# Patient Record
Sex: FEMALE | Race: WHITE | Employment: PART TIME | URBAN - METROPOLITAN AREA
[De-identification: names, ages, dates, MRNs, and addresses within clinical notes are randomized per-mention and may not be internally consistent; named-entity substitution may affect disease eponyms.]

---

## 2017-08-06 ENCOUNTER — OFFICE VISIT (OUTPATIENT)
Dept: URGENT CARE | Facility: URGENT CARE | Age: 37
End: 2017-08-06
Payer: COMMERCIAL

## 2017-08-06 VITALS
DIASTOLIC BLOOD PRESSURE: 73 MMHG | HEIGHT: 60 IN | TEMPERATURE: 98.5 F | WEIGHT: 115 LBS | SYSTOLIC BLOOD PRESSURE: 130 MMHG | OXYGEN SATURATION: 97 % | BODY MASS INDEX: 22.58 KG/M2 | HEART RATE: 67 BPM

## 2017-08-06 DIAGNOSIS — S05.01XA CONJUNCTIVAL ABRASION, RIGHT, INITIAL ENCOUNTER: Primary | ICD-10-CM

## 2017-08-06 DIAGNOSIS — H18.821: ICD-10-CM

## 2017-08-06 PROCEDURE — 99213 OFFICE O/P EST LOW 20 MIN: CPT | Performed by: FAMILY MEDICINE

## 2017-08-06 RX ORDER — POLYMYXIN B SULFATE AND TRIMETHOPRIM 1; 10000 MG/ML; [USP'U]/ML
1 SOLUTION OPHTHALMIC EVERY 4 HOURS
Qty: 10 ML | Refills: 0 | Status: SHIPPED | OUTPATIENT
Start: 2017-08-06 | End: 2017-08-12

## 2017-08-06 NOTE — PROGRESS NOTES
SUBJECTIVE:     Chief Complaint   Patient presents with     Urgent Care     Pt in clinic to have eval for bilateral eye irritation and discharge.     Eye Problem     History of Present Illness:  Hellen Dixon is a 37 year old female who presents complaining of moderate right eye mattering, redness, injury -  Possible contact lens overwear for 1 day(s). , some watering in both eyes  Onset/timing: sudden.    Associated Signs and Symptoms: none  Treatment measures tried include: flushed with water  and took out her contacts  Contact wearer : Yes   Past Medical History:   Diagnosis Date     NO ACTIVE PROBLEMS        ALLERGIES:  Review of patient's allergies indicates no known allergies.      Current Outpatient Prescriptions on File Prior to Visit:  Naproxen Sodium (ALEVE PO)    ALLEGRA-D 12 HOUR  MG PO TB12 1 TABLET TWICE DAILY ON EMPTY STOMACH (Patient not taking: No sig reported)   ALLEGRA-D 24 HOUR 180-240 MG PO TB24 TAKE ONE DAILY (Patient not taking: No sig reported)   ZYRTEC 10 MG OR TABS 1 TABLET DAILY   FLUTICASONE PROPIONATE (NASAL) 50 MCG/ACT NA SUSP 2 squirts in each nostril daily (Patient not taking: No sig reported)   ROBITUSSIN A-C  MG/5ML OR SYRP ONE TO TWO TEASPOONS EVER 4 HOURS, AS NEEDED FOR COUGH (Patient not taking: No sig reported)     No current facility-administered medications on file prior to visit.     Social History   Substance Use Topics     Smoking status: Former Smoker     Smokeless tobacco: Not on file      Comment: quit 1999     Alcohol use Yes      Comment: socially       Family History   Problem Relation Age of Onset     Family History Negative No family hx of          ROS:  INTEGUMENTARY/SKIN: NEGATIVE for worrisome rashes, moles or lesions  ENT/MOUTH: NEGATIVE for ear, mouth and throat problems  RESP:NEGATIVE for significant cough or SOB  GI: NEGATIVE for nausea, abdominal pain, heartburn, or change in bowel habits    OBJECTIVE:  /73  Pulse 67  Temp 98.5  F (36.9   C) (Oral)  Ht 5' (1.524 m)  Wt 115 lb (52.2 kg)  SpO2 97%  BMI 22.46 kg/m2  General: no acute distress  Right eye:BLACK, EOMI, fundi normal, corneas normal, no foreign bodies, flourescein staining shows scratch of conjunctiva, inferior nasal side  6 mm long 1 mm wide,  Close, but outside the margin of the cornea, visual acuity normal both eyes, no periorbital cellulitis  Left eye: BLACK, EOMI, fundi normal, corneas normal, no foreign bodies, flourescein staining is negative, visual acuity normal both eyes, no periorbital cellulitis     Ears: normal canals, TMs bilaterally, normal TM mobility  Nose: NORMAL - no drainage, turbinates normal in size.  Neck: supple, non-tender, free range of motion, no adenopathy    ASSESSMENT/ PLAN  Conjunctival abrasion, right, initial encounter     - trimethoprim-polymyxin b (POLYTRIM) ophthalmic solution; Place 1 drop into the right eye every 4 hours for 6 days    Contact lens overwear, right      - trimethoprim-polymyxin b (POLYTRIM) ophthalmic solution; Place 1 drop into the right eye every 4 hours for 6 days     To ER if acute change in vision,   Follow-up with ophthalmology if symptoms do not resolve as expected

## 2017-08-06 NOTE — MR AVS SNAPSHOT
After Visit Summary   8/6/2017    Hellen Dixon    MRN: [id number]           Patient Information     Date Of Birth          1980        Visit Information        Provider Department      8/6/2017 10:50 AM Shruthi Reyes MD [facility]        Today's Diagnoses     Conjunctival abrasion, right, initial encounter    -  1    Contact lens overwear, right          Care Instructions      Contact Lens Injury    Your contact lens can cause injury to the cornea (the clear part in the front of the eye). This can occur by sleeping with a hard or soft contact lens in place or wearing a contact lens longer than advised. There is also an increased risk of injury if your eyes dry out too much while wearing a contact lens.  The cornea is very painful when injured, but it usually heals quickly. It usually improves within 24 to 48 hours. If the injury is deep, your healthcare provider may apply an eye patch. This is to reduce pain and speed up the healing process. An antibiotic ointment or eye drops may also be used. Healing is complete when the pain stops and there are no other symptoms, such as eye redness, tearing or discharge, or worsening vision.  Home care    Do not wear contacts until you are pain free.    A cold pack (ice in a plastic bag, wrapped in a towel) may be applied over the eye for 20 minutes at a time to reduce pain.    Acetaminophen or ibuprofen can be used for pain, unless another medicine was prescribed. (Note: If you have chronic liver or kidney disease, or have ever had a stomach ulcer or gastrointestinal bleeding, talk with your healthcare provider before using these medicines.)    If an eye patch was applied:    Apply the ice pack directly over the eye patch as described above.    If you were given a  follow-up appointment for patch removal and re-exam, do not miss it. An eye patch should not be left in place for more than 48 hours, unless advised to do so by your  "healthcare provider.    Do not drive a motor vehicle or operate machinery with the patch in place. You will have difficulty in judging distances with only one eye.  Follow-up care  Follow up with your healthcare provider, or as advised.  When to seek medical advice  Call your healthcare provider right away if any of these occur:    Increasing eye pain or pain that does not improve after 24 hours    Discharge from the eye    Increasing redness of the eye or swelling of the eyelids    Worsening vision  Date Last Reviewed: 6/14/2015 2000-2017 [employer]. [address] [place], [place] [id number]. All rights reserved. This information is not intended as a substitute for professional medical care. Always follow your healthcare professional's instructions.                Follow-ups after your visit        Who to contact     If you have questions or need follow up information about today's clinic visit or your schedule please contact [facility] directly at [phone].  Normal or non-critical lab and imaging results will be communicated to you by MyChart, letter or phone within 4 business days after the clinic has received the results. If you do not hear from us within 7 days, please contact the clinic through [employer] or phone. If you have a critical or abnormal lab result, we will notify you by phone as soon as possible.  Submit refill requests through [employer] or call your pharmacy and they will forward the refill request to us. Please allow 3 business days for your refill to be completed.          Additional Information About Your Visit        [employer] Information     [employer] lets you send messages to your doctor, view your test results, renew your prescriptions, schedule appointments and more. To sign up, go to [email or web address] . Click on \"Log in\" on the left side of the screen, which will take you to the Welcome page. Then click on \"Sign up Now\" on the right side of the " page.     You will be asked to enter the access code listed below, as well as some personal information. Please follow the directions to create your username and password.     Your access code is: [email or web address]  Expires: 2017 11:32 AM     Your access code will  in 90 days. If you need help or a new code, please call your [place] clinic or [phone].        Care EveryWhere ID     This is your Care EveryWhere ID. This could be used by other organizations to access your [place] medical records  [id number]        Your Vitals Were     Pulse Temperature Height Pulse Oximetry BMI (Body Mass Index)       67 98.5  F (36.9  C) (Oral) 5' (1.524 m) 97% 22.46 kg/m2        Blood Pressure from Last 3 Encounters:   17 130/73   14 116/70   01/07/10 116/72    Weight from Last 3 Encounters:   17 115 lb (52.2 kg)   14 112 lb (50.8 kg)   01/07/10 109 lb (49.4 kg)              Today, you had the following     No orders found for display         Today's Medication Changes          These changes are accurate as of: 17 11:32 AM.  If you have any questions, ask your nurse or doctor.               Start taking these medicines.        Dose/Directions    trimethoprim-polymyxin b ophthalmic solution   Commonly known as:  POLYTRIM   Used for:  Contact lens overwear, right, Conjunctival abrasion, right, initial encounter   Started by:  Shruthi Reyes MD        Dose:  1 drop   Place 1 drop into the right eye every 4 hours for 6 days   Quantity:  10 mL   Refills:  0            Where to get your medicines      These medications were sent to [facility] [address] [facility] [address] [facility] [address]  [address], [facility] [address]     Phone:  [phone]     trimethoprim-polymyxin b ophthalmic solution                Primary Care Provider    None Specified       No primary provider on file.        Equal Access to Services     SHAQUILLE ERNANDEZ AH: Heaven wagner  su Rizo, waaxda luqadaha, qaybta kaalmada adejorge, minor matthewscasimiro citlalli. So [facility] [phone].    ATENCIÓN: Si jaime moreland, tiene a roman disposición servicios gratuitos de asistencia lingüística. Hang al [phone].    We comply with applicable federal civil rights laws and [place] laws. We do not discriminate on the basis of race, color, national origin, age, disability sex, sexual orientation or gender identity.            Thank you!     Thank you for choosing [facility]  for your care. Our goal is always to provide you with excellent care. Hearing back from our patients is one way we can continue to improve our services. Please take a few minutes to complete the written survey that you may receive in the mail after your visit with us. Thank you!             Your Updated Medication List - Protect others around you: Learn how to safely use, store and throw away your medicines at www.disposemymeds.org.          This list is accurate as of: 8/6/17 11:32 AM.  Always use your most recent med list.                   Brand Name Dispense Instructions for use Diagnosis    ALEVE PO           * ALLEGRA-D 24 HOUR 180-240 MG per 24 hr tablet   Generic drug:  fexofenadine-pseudoePHEDrine     30 Tab    TAKE ONE DAILY    Chronic rhinitis       * ALLEGRA-D 12 HOUR  MG per 12 hr tablet   Generic drug:  fexofenadine-pseudoePHEDrine     60 Tab    1 TABLET TWICE DAILY ON EMPTY STOMACH    Allergies       FLUTICASONE PROPIONATE (NASAL) 50 MCG/ACT Susp     1    2 squirts in each nostril daily    Chronic rhinitis       ROBITUSSIN A-C  MG/5ML Syrp   Generic drug:  CODEINE-GUAIFENESIN     4 OZ    ONE TO TWO TEASPOONS EVER 4 HOURS, AS NEEDED FOR COUGH    Cough       trimethoprim-polymyxin b ophthalmic solution    POLYTRIM    10 mL    Place 1 drop into the right eye every 4 hours for 6 days    Contact lens overwear, right, Conjunctival abrasion, right, initial encounter        ZYRTEC 10 MG tablet   Generic drug:  cetirizine      1 TABLET DAILY        * Notice:  This list has 2 medication(s) that are the same as other medications prescribed for you. Read the directions carefully, and ask your doctor or other care provider to review them with you.

## 2017-08-06 NOTE — NURSING NOTE
Chief Complaint   Patient presents with     Urgent Care     Pt in clinic to have eval for bilateral eye irritation and discharge.     Eye Problem       Initial /73  Pulse 67  Temp 98.5  F (36.9  C) (Oral)  Ht 5' (1.524 m)  Wt 115 lb (52.2 kg)  SpO2 97%  BMI 22.46 kg/m2 Estimated body mass index is 22.46 kg/(m^2) as calculated from the following:    Height as of this encounter: 5' (1.524 m).    Weight as of this encounter: 115 lb (52.2 kg).  Medication Reconciliation: complete   Farideh Philip/ MA

## 2017-08-06 NOTE — PATIENT INSTRUCTIONS
Contact Lens Injury    Your contact lens can cause injury to the cornea (the clear part in the front of the eye). This can occur by sleeping with a hard or soft contact lens in place or wearing a contact lens longer than advised. There is also an increased risk of injury if your eyes dry out too much while wearing a contact lens.  The cornea is very painful when injured, but it usually heals quickly. It usually improves within 24 to 48 hours. If the injury is deep, your healthcare provider may apply an eye patch. This is to reduce pain and speed up the healing process. An antibiotic ointment or eye drops may also be used. Healing is complete when the pain stops and there are no other symptoms, such as eye redness, tearing or discharge, or worsening vision.  Home care    Do not wear contacts until you are pain free.    A cold pack (ice in a plastic bag, wrapped in a towel) may be applied over the eye for 20 minutes at a time to reduce pain.    Acetaminophen or ibuprofen can be used for pain, unless another medicine was prescribed. (Note: If you have chronic liver or kidney disease, or have ever had a stomach ulcer or gastrointestinal bleeding, talk with your healthcare provider before using these medicines.)    If an eye patch was applied:    Apply the ice pack directly over the eye patch as described above.    If you were given a  follow-up appointment for patch removal and re-exam, do not miss it. An eye patch should not be left in place for more than 48 hours, unless advised to do so by your healthcare provider.    Do not drive a motor vehicle or operate machinery with the patch in place. You will have difficulty in judging distances with only one eye.  Follow-up care  Follow up with your healthcare provider, or as advised.  When to seek medical advice  Call your healthcare provider right away if any of these occur:    Increasing eye pain or pain that does not improve after 24 hours    Discharge from the  eye    Increasing redness of the eye or swelling of the eyelids    Worsening vision  Date Last Reviewed: 6/14/2015 2000-2017 [employer]. [address] [place] [address], [place] [address]. All rights reserved. This information is not intended as a substitute for professional medical care. Always follow your healthcare professional's instructions.

## 2018-08-25 ENCOUNTER — OFFICE VISIT (OUTPATIENT)
Dept: URGENT CARE | Facility: URGENT CARE | Age: 38
End: 2018-08-25
Payer: COMMERCIAL

## 2018-08-25 VITALS
HEART RATE: 73 BPM | TEMPERATURE: 98.2 F | SYSTOLIC BLOOD PRESSURE: 100 MMHG | OXYGEN SATURATION: 97 % | BODY MASS INDEX: 23.44 KG/M2 | RESPIRATION RATE: 24 BRPM | DIASTOLIC BLOOD PRESSURE: 50 MMHG | WEIGHT: 120 LBS

## 2018-08-25 DIAGNOSIS — J20.9 ACUTE BRONCHITIS, UNSPECIFIED ORGANISM: ICD-10-CM

## 2018-08-25 DIAGNOSIS — J98.01 ACUTE BRONCHOSPASM: Primary | ICD-10-CM

## 2018-08-25 DIAGNOSIS — R05.9 COUGH: ICD-10-CM

## 2018-08-25 PROCEDURE — 99214 OFFICE O/P EST MOD 30 MIN: CPT | Performed by: FAMILY MEDICINE

## 2018-08-25 PROCEDURE — 87801 DETECT AGNT MULT DNA AMPLI: CPT | Performed by: FAMILY MEDICINE

## 2018-08-25 RX ORDER — ALBUTEROL SULFATE 90 UG/1
2 AEROSOL, METERED RESPIRATORY (INHALATION) EVERY 6 HOURS PRN
Qty: 1 INHALER | Refills: 0 | Status: SHIPPED | OUTPATIENT
Start: 2018-08-25

## 2018-08-25 RX ORDER — CODEINE PHOSPHATE AND GUAIFENESIN 10; 100 MG/5ML; MG/5ML
2 SOLUTION ORAL EVERY 4 HOURS PRN
Qty: 120 ML | Refills: 0 | Status: SHIPPED | OUTPATIENT
Start: 2018-08-25

## 2018-08-25 NOTE — PROGRESS NOTES
SUBJECTIVE:   Hellen Dixon is a 38 year old female presenting with a chief complaint of cough.  Initially had sore throat about 4-5 days ago, this has improved.  Patient was seen at [facility] and had negative strep.  Unable to sleep.  Onset of symptoms was 2 day(s) ago for cough, overall has been sick for about 4-5 days.  Course of illness is worsening.    Severity moderate  Current and Associated symptoms: cough  Treatment measures tried include: Delsym, Guaifenesin, Fluids and Rest.  Predisposing factors include: seasonal allergies.    Tdap 2014  Family history - sister with asthma    No current PCP.    Past Medical History:   Diagnosis Date     NO ACTIVE PROBLEMS      Current Outpatient Prescriptions   Medication Sig Dispense Refill     Naproxen Sodium (ALEVE PO)        ZYRTEC 10 MG OR TABS 1 TABLET DAILY       ALLEGRA-D 12 HOUR  MG PO TB12 1 TABLET TWICE DAILY ON EMPTY STOMACH (Patient not taking: No sig reported) 60 Tab 5     ALLEGRA-D 24 HOUR 180-240 MG PO TB24 TAKE ONE DAILY (Patient not taking: No sig reported) 30 Tab 5     FLUTICASONE PROPIONATE (NASAL) 50 MCG/ACT NA SUSP 2 squirts in each nostril daily (Patient not taking: No sig reported) 1 5     ROBITUSSIN A-C  MG/5ML OR SYRP ONE TO TWO TEASPOONS EVER 4 HOURS, AS NEEDED FOR COUGH (Patient not taking: No sig reported) 4 OZ 0     Social History   Substance Use Topics     Smoking status: Former Smoker     Smokeless tobacco: Not on file      Comment: quit 1999     Alcohol use Yes      Comment: socially       ROS:  Review of systems negative except as stated above.    OBJECTIVE:  /50 (Cuff Size: Adult Regular)  Pulse 73  Temp 98.2  F (36.8  C) (Tympanic)  Resp 24  Wt 120 lb (54.4 kg)  SpO2 97%  BMI 23.44 kg/m2  GENERAL APPEARANCE: healthy, alert and no distress  EYES: EOMI,  PERRL, conjunctiva clear  HENT: ear canals and TM's normal.  Nose and mouth without ulcers, erythema or lesions  NECK: supple, nontender, no  lymphadenopathy  RESP: lungs clear to auscultation - no rales, rhonchi or wheezes  CV: regular rates and rhythm, normal S1 S2, no murmur noted  Extremities: no peripheral edema or tenderness, peripheral pulses normal  NEURO: Normal strength and tone, sensory exam grossly normal,  normal speech and mentation  SKIN: no suspicious lesions or rashes  PSYCH: mentation appears normal and affect normal/bright    ASSESSMENT/PLAN:  (J98.01) Acute bronchospasm  (primary encounter diagnosis)  Plan: albuterol (PROAIR HFA/PROVENTIL HFA/VENTOLIN         HFA) 108 (90 Base) MCG/ACT inhaler            (R05) Cough  Comment: bronchitis/bronchospasm  Plan: Bordetella per/paraper PCR, guaiFENesin-codeine        (ROBITUSSIN AC) 100-10 MG/5ML SOLN solution            (J20.9) Acute bronchitis, unspecified organism  Plan: guaiFENesin-codeine (ROBITUSSIN AC) 100-10         MG/5ML SOLN solution, albuterol (PROAIR         HFA/PROVENTIL HFA/VENTOLIN HFA) 108 (90 Base)         MCG/ACT inhaler              Reassurance given, reviewed symptomatic treatment, plenty of fluids and rest.  RX albuterol inhaler and raven AC given to help with cough.  Discussed that due to coughing to point of throwing up may be caused by pertussis, so will obtain screen.  If positive, then will need antibiotic treatment.  Reviewed at this time that cough is still most likely viral etiology and will not require an antibiotic.  Reviewed that pertussis will cause cough to last for several months.    Return to clinic if no resolution of symptoms.    Florentino Perea MD  August 25, 2018 7:56 PM

## 2018-08-25 NOTE — MR AVS SNAPSHOT
After Visit Summary   8/25/2018    Hellen Dixon    MRN: [id number]           Patient Information     Date Of Birth          1980        Visit Information        Provider Department      8/25/2018 5:55 PM Florentino Perea MD [facility]        Today's Diagnoses     Cough    -  1    Acute bronchospasm          Care Instructions    Okay to take allergy medication.  Okay to use albuterol inhaler to help with cough, wheezing and shortness of breath.  Use robitussin with codeine to help with cough, best at bedtime.      Bronchospasm (Adult)    Bronchospasm occurs when the airways (bronchial tubes) go into spasm and contract. This makes it hard to breathe and causes wheezing (a high-pitched whistling sound). Bronchospasm can also cause frequent coughing without wheezing.  Bronchospasm is due to irritation, inflammation, or allergic reaction of the airways. People with asthma get bronchospasm. However, not everyone with bronchospasm has asthma.  Being exposed to harmful fumes, a recent case of bronchitis, exercise, or a flare-up of chronic obstructive pulmonary disease (COPD) may cause the airways to spasm. An episode of bronchospasm may last 7 to 14 days. Medicine may be prescribed to relax the airways and prevent wheezing. Antibiotics will be prescribed only if your healthcare provider thinks there is a bacterial infection. Antibiotics do not help a viral infection.  Home care    Drink lots of water or other fluids (at least 10 glasses a day) during an attack. This will loosen lung secretions and make it easier to breathe. If you have heart or kidney disease, check with your doctor before you drink extra fluids.    Take prescribed medicine exactly at the times advised. If you take an inhaled medicine to help with breathing, do not use it more than once every 4 hours, unless told to do so. If prescribed an antibiotic or prednisone, take all of the medicine, even if you are feeling better after  "a few days.    Do not smoke. Also avoid being exposed to secondhand smoke.    If you were given an inhaler, use it exactly as directed. If you need to use it more often than prescribed, your condition may be getting worse. Contact your healthcare provider.  Follow-up care  Follow up with your healthcare provider, or as advised.  If you are age 65 or older, have a chronic lung disease or condition that affects your immune system, or you smoke, ask your healthcare provider about getting a pneumococcal vaccine, as well as a yearly flu shot (influenza vaccine).  When to seek medical advice  Call your healthcare provider right away if any of these occur:    You need to use your inhalers more often than usual    Fever of 100.4 F (38 C) or higher, or as directed by your healthcare provider    Cough that brings up lots of dark-colored sputum (mucus)    You don't get better within 24 hours  Call 911  Call 911 if any of these occur:    Coughing up bloody sputum (mucus)    Chest pain with each breath    Increased wheezing or shortness of breath  Date Last Reviewed: 9/13/2015 2000-2017 [employer]. [address] [place] [address]. All rights reserved. This information is not intended as a substitute for professional medical care. Always follow your healthcare professional's instructions.        Whooping Cough (Pertussis) (Adult)  Whooping cough (pertussis) is a bacterial infection in the respiratory tract. It can be a very serious infection in infants and older adults. In healthy older children and adults, it is generally mild.  Pertussis is highly contagious. The infection is spread through the air by coughing or sneezing. The illness starts like an ordinary cold with mild cough, congestion, and low fever. Symptoms then develop that may include:    Coughing spells that cause a \"whooping\" sound when breathing in    Gagging or vomiting after coughing    Poor appetite    Feeling very tired    Thick " mucus in the nose and throat  Antibiotics are used to treat this illness. Even with treatment, it may take up to 3 months for the cough to go away completely.  Vaccination prevents pertussis in children. The vaccine effect lessens after 5 to 10 years. So a booster vaccine is often needed. Teens and adults who were vaccinated as children and have not had a booster can be infected and may spread the infection to unvaccinated infants and children. Be sure to ask your healthcare provider whether anyone in your household needs a booster vaccination.  Home Care    Take all medicine as prescribed by the healthcare provider. Be sure to take antibiotics as directed until they are gone, even if you feel better. If you do not finish the antibiotics, the infection may come back and be harder to treat.    Rest and get plenty of sleep.    Stay home from work or school until you have completed at least 5 days of antibiotic treatment. If antibiotics are not used, stay home until 21 days after you first had symptoms of a cough. When resuming activity, go back to your normal routine gradually.    Avoid exposure to cigarette smoke.    Ask your healthcare provider before taking over-the-counter medicine for fever, pain, and coughing.    To avoid loss of fluids (dehydration), try drinking 6-8 glasses of fluids (water, juice, tea, soup) a day. Fluids will help loosen secretions in the nose and lungs.    Cover your mouth and nose when you sneeze or cough. Dispose of any tissues properly.    Wash your hands well with soap and water after you cough or sneeze, and frequently throughout the day.  Follow-up care  Follow up with your healthcare provider as advised.  When to seek medical advice  Call your healthcare provider right away for any of the following:    Trouble breathing or painful breathing    Cough with repeated gagging or vomiting    Coughing up colored or bloody mucus    Severe headache or face, neck, or ear pain    Fever over  100.4 F (38.0 C) for more than 3 days    You don't start improving within 1 week  Date Last Reviewed: 9/25/2015 2000-2017 [employer]. [address] [place] [address], [place] [address]. All rights reserved. This information is not intended as a substitute for professional medical care. Always follow your healthcare professional's instructions.        Chronic Cough with Uncertain Cause (Adult)    Everyone has had a cough as part of the common cold, flu, or bronchitis. This kind of cough occurs along with an achy feeling, low-grade fever, nasal and sinus congestion, and a scratchy or sore throat. This usually gets better in 2 to 3 weeks. A cough that lasts longer than 3 weeks may be due to other causes.  If your cough does not improve over the next 2 weeks, further testing may be needed. Follow up with your healthcare provider as advised. Cough suppressants may be recommended. Based on your exam today, the exact cause of your cough is not certain. Below are some common causes for persistent cough.  Smoker's cough  Smoker s cough doesn t go away. If you continue to smoke, it only gets worse. The cough is from irritation in the air passages. Talk to your healthcare provider about quitting. Medicines or nicotine-replacement products, like gum or the patch, may make quitting easier.  Postnasal drip  A cough that is worse at night may be due to postnasal drip. Excess mucus in the nose drains from the back of your nose to your throat. This triggers the cough reflex. Postnasal drip may be due to a sinus infection or allergy. Common allergens include dust, tobacco smoke (both inhaled and secondhand smoke), environmental pollutants, pollen, mold, pets, cleaning agents, room deodorizers, and chemical fumes. Over-the-counter antihistamines or decongestants may be helpful for allergies. A sinus infection may requires antibiotic treatment. See your healthcare provider if symptoms continue.  Medicines  Certain prescribed  medicines can cause a chronic cough in some people:    ACE inhibitors for high blood pressure. These include benazepril, captopril, enalapril, fosinopril, lisinopril, quinapril, ramipril, and others.    Beta-blockers for high blood pressure and other conditions. These include propranolol, atenolol, metoprolol, nadolol, and others.  Let your healthcare provider know if you are taking any of these.  Asthma  Cough may be the only sign of mild asthma. You may have tests to find out if asthma is causing your cough. You may also take asthma medicine on a trial basis.  Acid reflux (heartburn, GERD)  The esophagus is the tube that carries food from the mouth to the stomach. A valve at its lower end prevents stomach acids from flowing upward. If this valve does not work properly, acid from the stomach enters the esophagus. This may cause a burning pain in the upper abdomen or lower chest, belching, or cough. Symptoms are often worse when lying flat. Avoid eating or drinking before bedtime. Try using extra pillows to raise your upper body, or place 4-inch blocks under the head of your bed. You may try an over-the-counter antacid or an acid-blocking medicine such as famotidine, cimetidine, ranitidine, esomeprazole, lansoprazole, or omeprazole. Stronger medicines for this condition can be prescribed by your healthcare provider.  Follow-up care  Follow up with your healthcare provider, or as advised, if your cough does not improve. Further testing may be needed.  Note: If an X-ray was taken, a specialist will review it. You will be notified of any new findings that may affect your care.  When to seek medical advice  Call your healthcare provider right away if any of these occur:    Mild wheezing or difficulty breathing    Fever of 100.4 F (38 C) or higher, or as directed by your healthcare provider    Unexpected weight loss    Coughing up large amounts of colored sputum    Night sweats (sheets and pajamas get soaking wet)  Call  "911  Call 911 if any of these occur:    Coughing up blood    Moderate to severe trouble breathing or wheezing  Date Last Reviewed: 2015-2017 [employer]. [address] [place] [address], [place] [address]. All rights reserved. This information is not intended as a substitute for professional medical care. Always follow your healthcare professional's instructions.                Follow-ups after your visit        Who to contact     If you have questions or need follow up information about today's clinic visit or your schedule please contact [facility] directly at [phone].  Normal or non-critical lab and imaging results will be communicated to you by [employer], letter or phone within 4 business days after the clinic has received the results. If you do not hear from us within 7 days, please contact the clinic through [employer] or phone. If you have a critical or abnormal lab result, we will notify you by phone as soon as possible.  Submit refill requests through [employer] or call your pharmacy and they will forward the refill request to us. Please allow 3 business days for your refill to be completed.          Additional Information About Your Visit        MyChart Information     [employer] lets you send messages to your doctor, view your test results, renew your prescriptions, schedule appointments and more. To sign up, go to [email or web address] . Click on \"Log in\" on the left side of the screen, which will take you to the Welcome page. Then click on \"Sign up Now\" on the right side of the page.     You will be asked to enter the access code listed below, as well as some personal information. Please follow the directions to create your username and password.     Your access code is: [email or web address]  Expires: 2018  7:21 PM     Your access code will  in 90 days. If you need help or a new code, please call your [place] clinic or [phone].        Care EveryWhere ID     This " is your Care EveryWhere ID. This could be used by other organizations to access your [place] medical records  [id number]        Your Vitals Were     Pulse Temperature Respirations Pulse Oximetry BMI (Body Mass Index)       73 98.2  F (36.8  C) (Tympanic) 24 97% 23.44 kg/m2        Blood Pressure from Last 3 Encounters:   08/25/18 100/50   08/06/17 130/73   11/06/14 116/70    Weight from Last 3 Encounters:   08/25/18 120 lb (54.4 kg)   08/06/17 115 lb (52.2 kg)   11/06/14 112 lb (50.8 kg)              We Performed the Following     Bordetella per/paraper PCR          Today's Medication Changes          These changes are accurate as of 8/25/18  7:21 PM.  If you have any questions, ask your nurse or doctor.               Start taking these medicines.        Dose/Directions    albuterol 108 (90 Base) MCG/ACT inhaler   Commonly known as:  PROAIR HFA/PROVENTIL HFA/VENTOLIN HFA   Used for:  Acute bronchospasm        Dose:  2 puff   Inhale 2 puffs into the lungs every 6 hours as needed for shortness of breath / dyspnea or wheezing   Quantity:  1 Inhaler   Refills:  0         These medicines have changed or have updated prescriptions.        Dose/Directions    * ROBITUSSIN A-C  MG/5ML Syrp   This may have changed:  Another medication with the same name was added. Make sure you understand how and when to take each.   Used for:  Cough   Generic drug:  CODEINE-GUAIFENESIN        ONE TO TWO TEASPOONS EVER 4 HOURS, AS NEEDED FOR COUGH   Quantity:  4 OZ   Refills:  0       * guaiFENesin-codeine 100-10 MG/5ML Soln solution   Commonly known as:  ROBITUSSIN AC   This may have changed:  You were already taking a medication with the same name, and this prescription was added. Make sure you understand how and when to take each.   Used for:  Cough        Dose:  2 tsp.   Take 10 mLs by mouth every 4 hours as needed for cough   Quantity:  120 mL   Refills:  0       * Notice:  This list has 2 medication(s) that are the same as other  medications prescribed for you. Read the directions carefully, and ask your doctor or other care provider to review them with you.         Where to get your medicines      These medications were sent to [facility] - [facility] [address]  [address], [facility] [address]     Phone:  [phone]     albuterol 108 (90 Base) MCG/ACT inhaler         Some of these will need a paper prescription and others can be bought over the counter.  Ask your nurse if you have questions.     Bring a paper prescription for each of these medications     guaiFENesin-codeine 100-10 MG/5ML Soln solution                Primary Care Provider Fax #    Physician No Ref-Primary [phone]       No address on file        Equal Access to Services     SHAQUILLE ERNANDEZ : Heaven blue Somilla, waaxda luqadaha, qaybta kaalmada adeegyada, minor fan . So [facility] [phone].    ATENCIÓN: Si habla español, tiene a roman disposición servicios gratuitos de asistencia lingüística. Llame al [phone].    We comply with applicable federal civil rights laws and [place] laws. We do not discriminate on the basis of race, color, national origin, age, disability, sex, sexual orientation, or gender identity.            Thank you!     Thank you for choosing [facility]  for your care. Our goal is always to provide you with excellent care. Hearing back from our patients is one way we can continue to improve our services. Please take a few minutes to complete the written survey that you may receive in the mail after your visit with us. Thank you!             Your Updated Medication List - Protect others around you: Learn how to safely use, store and throw away your medicines at www.disposemymeds.org.          This list is accurate as of 8/25/18  7:21 PM.  Always use your most recent med list.                   Brand Name Dispense Instructions  for use Diagnosis    albuterol 108 (90 Base) MCG/ACT inhaler    PROAIR HFA/PROVENTIL HFA/VENTOLIN HFA    1 Inhaler    Inhale 2 puffs into the lungs every 6 hours as needed for shortness of breath / dyspnea or wheezing    Acute bronchospasm       ALEVE PO           * ALLEGRA-D 24 HOUR 180-240 MG per 24 hr tablet   Generic drug:  fexofenadine-pseudoePHEDrine     30 Tab    TAKE ONE DAILY    Chronic rhinitis       * ALLEGRA-D 12 HOUR  MG per 12 hr tablet   Generic drug:  fexofenadine-pseudoePHEDrine     60 Tab    1 TABLET TWICE DAILY ON EMPTY STOMACH    Allergies       FLUTICASONE PROPIONATE (NASAL) 50 MCG/ACT Susp     1    2 squirts in each nostril daily    Chronic rhinitis       * ROBITUSSIN A-C  MG/5ML Syrp   Generic drug:  CODEINE-GUAIFENESIN     4 OZ    ONE TO TWO TEASPOONS EVER 4 HOURS, AS NEEDED FOR COUGH    Cough       * guaiFENesin-codeine 100-10 MG/5ML Soln solution    ROBITUSSIN AC    120 mL    Take 10 mLs by mouth every 4 hours as needed for cough    Cough       ZYRTEC 10 MG tablet   Generic drug:  cetirizine      1 TABLET DAILY        * Notice:  This list has 4 medication(s) that are the same as other medications prescribed for you. Read the directions carefully, and ask your doctor or other care provider to review them with you.

## 2018-08-26 NOTE — PATIENT INSTRUCTIONS
Okay to take allergy medication.  Okay to use albuterol inhaler to help with cough, wheezing and shortness of breath.  Use robitussin with codeine to help with cough, best at bedtime.      Bronchospasm (Adult)    Bronchospasm occurs when the airways (bronchial tubes) go into spasm and contract. This makes it hard to breathe and causes wheezing (a high-pitched whistling sound). Bronchospasm can also cause frequent coughing without wheezing.  Bronchospasm is due to irritation, inflammation, or allergic reaction of the airways. People with asthma get bronchospasm. However, not everyone with bronchospasm has asthma.  Being exposed to harmful fumes, a recent case of bronchitis, exercise, or a flare-up of chronic obstructive pulmonary disease (COPD) may cause the airways to spasm. An episode of bronchospasm may last 7 to 14 days. Medicine may be prescribed to relax the airways and prevent wheezing. Antibiotics will be prescribed only if your healthcare provider thinks there is a bacterial infection. Antibiotics do not help a viral infection.  Home care    Drink lots of water or other fluids (at least 10 glasses a day) during an attack. This will loosen lung secretions and make it easier to breathe. If you have heart or kidney disease, check with your doctor before you drink extra fluids.    Take prescribed medicine exactly at the times advised. If you take an inhaled medicine to help with breathing, do not use it more than once every 4 hours, unless told to do so. If prescribed an antibiotic or prednisone, take all of the medicine, even if you are feeling better after a few days.    Do not smoke. Also avoid being exposed to secondhand smoke.    If you were given an inhaler, use it exactly as directed. If you need to use it more often than prescribed, your condition may be getting worse. Contact your healthcare provider.  Follow-up care  Follow up with your healthcare provider, or as advised.  If you are age 65 or older,  "have a chronic lung disease or condition that affects your immune system, or you smoke, ask your healthcare provider about getting a pneumococcal vaccine, as well as a yearly flu shot (influenza vaccine).  When to seek medical advice  Call your healthcare provider right away if any of these occur:    You need to use your inhalers more often than usual    Fever of 100.4 F (38 C) or higher, or as directed by your healthcare provider    Cough that brings up lots of dark-colored sputum (mucus)    You don't get better within 24 hours  Call 911  Call 911 if any of these occur:    Coughing up bloody sputum (mucus)    Chest pain with each breath    Increased wheezing or shortness of breath  Date Last Reviewed: 9/13/2015 2000-2017 [employer]. [address] [place] [address], [place] [address]. All rights reserved. This information is not intended as a substitute for professional medical care. Always follow your healthcare professional's instructions.        Whooping Cough (Pertussis) (Adult)  Whooping cough (pertussis) is a bacterial infection in the respiratory tract. It can be a very serious infection in infants and older adults. In healthy older children and adults, it is generally mild.  Pertussis is highly contagious. The infection is spread through the air by coughing or sneezing. The illness starts like an ordinary cold with mild cough, congestion, and low fever. Symptoms then develop that may include:    Coughing spells that cause a \"whooping\" sound when breathing in    Gagging or vomiting after coughing    Poor appetite    Feeling very tired    Thick mucus in the nose and throat  Antibiotics are used to treat this illness. Even with treatment, it may take up to 3 months for the cough to go away completely.  Vaccination prevents pertussis in children. The vaccine effect lessens after 5 to 10 years. So a booster vaccine is often needed. Teens and adults who were vaccinated as children and have not had a " booster can be infected and may spread the infection to unvaccinated infants and children. Be sure to ask your healthcare provider whether anyone in your household needs a booster vaccination.  Home Care    Take all medicine as prescribed by the healthcare provider. Be sure to take antibiotics as directed until they are gone, even if you feel better. If you do not finish the antibiotics, the infection may come back and be harder to treat.    Rest and get plenty of sleep.    Stay home from work or school until you have completed at least 5 days of antibiotic treatment. If antibiotics are not used, stay home until 21 days after you first had symptoms of a cough. When resuming activity, go back to your normal routine gradually.    Avoid exposure to cigarette smoke.    Ask your healthcare provider before taking over-the-counter medicine for fever, pain, and coughing.    To avoid loss of fluids (dehydration), try drinking 6-8 glasses of fluids (water, juice, tea, soup) a day. Fluids will help loosen secretions in the nose and lungs.    Cover your mouth and nose when you sneeze or cough. Dispose of any tissues properly.    Wash your hands well with soap and water after you cough or sneeze, and frequently throughout the day.  Follow-up care  Follow up with your healthcare provider as advised.  When to seek medical advice  Call your healthcare provider right away for any of the following:    Trouble breathing or painful breathing    Cough with repeated gagging or vomiting    Coughing up colored or bloody mucus    Severe headache or face, neck, or ear pain    Fever over 100.4 F (38.0 C) for more than 3 days    You don't start improving within 1 week  Date Last Reviewed: 9/25/2015 2000-2017 [employer]. [address] [place] [address], [place] [address]. All rights reserved. This information is not intended as a substitute for professional medical care. Always follow your healthcare professional's  instructions.        Chronic Cough with Uncertain Cause (Adult)    Everyone has had a cough as part of the common cold, flu, or bronchitis. This kind of cough occurs along with an achy feeling, low-grade fever, nasal and sinus congestion, and a scratchy or sore throat. This usually gets better in 2 to 3 weeks. A cough that lasts longer than 3 weeks may be due to other causes.  If your cough does not improve over the next 2 weeks, further testing may be needed. Follow up with your healthcare provider as advised. Cough suppressants may be recommended. Based on your exam today, the exact cause of your cough is not certain. Below are some common causes for persistent cough.  Smoker's cough  Smoker s cough doesn t go away. If you continue to smoke, it only gets worse. The cough is from irritation in the air passages. Talk to your healthcare provider about quitting. Medicines or nicotine-replacement products, like gum or the patch, may make quitting easier.  Postnasal drip  A cough that is worse at night may be due to postnasal drip. Excess mucus in the nose drains from the back of your nose to your throat. This triggers the cough reflex. Postnasal drip may be due to a sinus infection or allergy. Common allergens include dust, tobacco smoke (both inhaled and secondhand smoke), environmental pollutants, pollen, mold, pets, cleaning agents, room deodorizers, and chemical fumes. Over-the-counter antihistamines or decongestants may be helpful for allergies. A sinus infection may requires antibiotic treatment. See your healthcare provider if symptoms continue.  Medicines  Certain prescribed medicines can cause a chronic cough in some people:    ACE inhibitors for high blood pressure. These include benazepril, captopril, enalapril, fosinopril, lisinopril, quinapril, ramipril, and others.    Beta-blockers for high blood pressure and other conditions. These include propranolol, atenolol, metoprolol, nadolol, and others.  Let your  healthcare provider know if you are taking any of these.  Asthma  Cough may be the only sign of mild asthma. You may have tests to find out if asthma is causing your cough. You may also take asthma medicine on a trial basis.  Acid reflux (heartburn, GERD)  The esophagus is the tube that carries food from the mouth to the stomach. A valve at its lower end prevents stomach acids from flowing upward. If this valve does not work properly, acid from the stomach enters the esophagus. This may cause a burning pain in the upper abdomen or lower chest, belching, or cough. Symptoms are often worse when lying flat. Avoid eating or drinking before bedtime. Try using extra pillows to raise your upper body, or place 4-inch blocks under the head of your bed. You may try an over-the-counter antacid or an acid-blocking medicine such as famotidine, cimetidine, ranitidine, esomeprazole, lansoprazole, or omeprazole. Stronger medicines for this condition can be prescribed by your healthcare provider.  Follow-up care  Follow up with your healthcare provider, or as advised, if your cough does not improve. Further testing may be needed.  Note: If an X-ray was taken, a specialist will review it. You will be notified of any new findings that may affect your care.  When to seek medical advice  Call your healthcare provider right away if any of these occur:    Mild wheezing or difficulty breathing    Fever of 100.4 F (38 C) or higher, or as directed by your healthcare provider    Unexpected weight loss    Coughing up large amounts of colored sputum    Night sweats (sheets and pajamas get soaking wet)  Call 911  Call 911 if any of these occur:    Coughing up blood    Moderate to severe trouble breathing or wheezing  Date Last Reviewed: 9/13/2015 2000-2017 [employer]. [address] [place] [address], [place] [address]. All rights reserved. This information is not intended as a substitute for professional medical care. Always follow your  healthcare professional's instructions.

## 2018-08-27 ENCOUNTER — NURSE TRIAGE (OUTPATIENT)
Dept: NURSING | Facility: CLINIC | Age: 38
End: 2018-08-27

## 2018-08-27 LAB
B PERT+PARAPERT DNA PNL SPEC NAA+PROBE: NEGATIVE
SPECIMEN SOURCE: NORMAL

## 2018-08-27 NOTE — TELEPHONE ENCOUNTER
Reason for Call: Called for results of testing 8/25/18 .  Patient Recommendations/Teaching: Advised Negative for B. pertussis and B. parapertussis by PCR and advised to call back if more help needed .  .Randa Connors RN [facility].

## 2019-02-25 ENCOUNTER — TRANSFERRED RECORDS (OUTPATIENT)
Dept: HEALTH INFORMATION MANAGEMENT | Facility: CLINIC | Age: 39
End: 2019-02-25

## 2019-02-25 LAB
CHOLEST SERPL-MCNC: 159 MG/DL (ref 100–199)
HDLC SERPL-MCNC: 48 MG/DL
LDLC SERPL CALC-MCNC: 91 MG/DL
NONHDLC SERPL-MCNC: 111 MG/DL
PAP-ABSTRACT: NORMAL
POTASSIUM SERPL-SCNC: 4.7 MMOL/L (ref 3.5–5)
TRIGL SERPL-MCNC: 102 MG/DL

## 2020-02-27 ENCOUNTER — OFFICE VISIT (OUTPATIENT)
Dept: URGENT CARE | Facility: URGENT CARE | Age: 40
End: 2020-02-27
Payer: COMMERCIAL

## 2020-02-27 VITALS
TEMPERATURE: 101.3 F | OXYGEN SATURATION: 98 % | SYSTOLIC BLOOD PRESSURE: 129 MMHG | DIASTOLIC BLOOD PRESSURE: 70 MMHG | HEART RATE: 78 BPM | RESPIRATION RATE: 20 BRPM

## 2020-02-27 DIAGNOSIS — J10.1 INFLUENZA A: Primary | ICD-10-CM

## 2020-02-27 DIAGNOSIS — J02.9 SORE THROAT: ICD-10-CM

## 2020-02-27 LAB
DEPRECATED S PYO AG THROAT QL EIA: NEGATIVE
FLUAV+FLUBV AG SPEC QL: NEGATIVE
FLUAV+FLUBV AG SPEC QL: POSITIVE
SPECIMEN SOURCE: ABNORMAL
SPECIMEN SOURCE: NORMAL
SPECIMEN SOURCE: NORMAL
STREP GROUP A PCR: NOT DETECTED

## 2020-02-27 PROCEDURE — 99213 OFFICE O/P EST LOW 20 MIN: CPT | Performed by: NURSE PRACTITIONER

## 2020-02-27 PROCEDURE — 40001204 ZZHCL STATISTIC STREP A RAPID: Performed by: NURSE PRACTITIONER

## 2020-02-27 PROCEDURE — 87651 STREP A DNA AMP PROBE: CPT | Performed by: NURSE PRACTITIONER

## 2020-02-27 PROCEDURE — 87804 INFLUENZA ASSAY W/OPTIC: CPT | Performed by: NURSE PRACTITIONER

## 2020-02-27 RX ORDER — OSELTAMIVIR PHOSPHATE 75 MG/1
75 CAPSULE ORAL 2 TIMES DAILY
Qty: 10 CAPSULE | Refills: 0 | Status: SHIPPED | OUTPATIENT
Start: 2020-02-27 | End: 2020-03-03

## 2020-02-27 ASSESSMENT — ENCOUNTER SYMPTOMS
SORE THROAT: 1
DIZZINESS: 0
RHINORRHEA: 1
FEVER: 1
SHORTNESS OF BREATH: 0
NAUSEA: 0
CHILLS: 1
DIARRHEA: 0
HEADACHES: 1
NECK PAIN: 0
ABDOMINAL PAIN: 0
LIGHT-HEADEDNESS: 0
VOMITING: 0
MYALGIAS: 1
WHEEZING: 0
COUGH: 1

## 2020-02-27 NOTE — PATIENT INSTRUCTIONS
tamiflu twice a day for 5 days    Self-Care at Home for Viral Illness        Diagnosis - what is wrong today         X Cold or Flu  ? Middle ear fluid  ? Cough ? Viral sore throat  ? Bronchitis  ? Other____________________________________________   You have a viral illness. Antibiotics do not cure viral infections. The treatments prescribed below will help you feel better while your body's own defenses are fighting the virus.   ? General instructions  Try the remedies below. In general:    Rest and sleep as much as needed.    Drink water and other clear fluids.    Do not smoke, and avoid smoke from other people.  ? Sinus pain or stuffy nose    Put a warm, moist washcloth on your face where you feel sinus pain or pressure.    Use a nasal spray with medicine or saline, as directed by your healthcare provider.    Breathe in steam from a hot shower.    Use a humidifier or cool mist vaporizer.   ? Cough    Use a humidifier or cool mist vaporizer.    Breathe in steam from a hot shower.    Use cough lozenges.   ?  Sore throat    Suck on ice chips, popsicles, or lozenges.    Use a sore throat spray.    Use a humidifier or cool mist vaporizer.    Gargle with saltwater.    Drink warm liquids.   ?  Ear pain    Hold a warm, moist washcloth on the ear for   10 minutes at a time.   ? Follow up  Call or return for a recheck if:    You have not improved in 5 -7 days.    You have new symptoms or other concerns. Medicines      You do not need a prescription for these helpful medicines. Take these medicines as directed on the package.      Pain: Tylenol and Ibuprofen.     Cough: Lozenges, cough suppressant (mucinex, guaifenisin)    Stuffy nose or sinus pain: Decongestants (afrin, neti pot), decongestant/antihistamine (Aprodex, Actifed), Pseudophedrine (sudafed)     Sore throat: Lozenges, anesthetic spray    Ear pain: Tylenol and Ibuprofen          Why not use antibiotics?  Infections can be caused by bacteria or viruses. Your [facility] provider is committed to prescribing antibiotics only when you need them.    Virus: Most infections are due to viruses. Infections caused by a virus cannot be treated with antibiotics.    Bacteria: Antibiotics can only treat illnesses that are caused by bacteria. When you need them, antibiotics can helpful in restoring you to good health.   What is the harm in getting an antibiotic?  We are learning more every day.       Antibiotics have side effects - some can be severe.  Up to 1 out of 4 people have a chance of having a side effect when taking an antibiotic. Some side effects are bothersome, but others can be severe. Each year many people go to the emergency department because of an antibiotic side effect. Some side effects of certain antibiotics include:    Upset stomach or throwing up (nausea or vomiting).    Watery stools (diarrhea).    Allergic reactions.    Permanent nerve damage (rare).    Torn tendons (rare).  Antibiotics can make bacteria tougher and harder to treat in the future. Bacteria can change so some antibiotics do not kill them anymore. When antibiotics are needed to save lives, we need them to work effectively.     Treating a virus with antibiotics is like using an umbrella to try to stay warm on a windy winter day. It just doesn't work.      Then, when it starts to rain, the umbrella may be worn out.    The protection the umbrella gives is   like an antibiotic. It is best to use it   when you need it most.     Getting an antibiotic makes you more likely to carry a drug-resistant bacteria that could infect you or those around you.    The good bacteria in your gut may be replaced by bad bacteria. For example, people receiving antibiotics have more infections with bad diarrhea called  C-diff.    Each year in the [place]:  Drug-resistant infections kill at least 23,000 people.  Severe diarrhea from C-diff sickens 250,000 people and causes 14,000 deaths.   Antibiotics kill or change  the good bacteria in your body for a long time, up to a year or longer. Changing good bacteria in your body may be the reason antibiotics have recently been linked to:     Weight gain, obesity, and possibly even diabetes when antibiotics are taken often at an early age.    Asthma, allergy, and immune system problems when antibiotics are taken when young.    Possible precancerous polyps in your colon when antibiotics are taken later in life.      Because of these reasons, you should only take antibiotics when they are clearly needed.      Photo by: Rene Leavitt VIII, reprinted according to the following terms of use: https://creativecommons.org/licenses/by/2.0/legalcode

## 2020-02-27 NOTE — PROGRESS NOTES
SUBJECTIVE:   Hellen Dixon is a 40 year old female presenting with a chief complaint of   Chief Complaint   Patient presents with     Urgent Care     URI     sore throat/fever/chills       She is an established patient of [facility].    URI Adult    Onset of symptoms was 2 day(s) ago.  Course of illness is worsening.    Severity moderate  Current and Associated symptoms: see ROS.   Treatment measures tried include Tylenol/Ibuprofen.  Predisposing factors include ill contact: Family member  and seasonal allergies.      Review of Systems   Constitutional: Positive for chills and fever.   HENT: Positive for congestion, rhinorrhea and sore throat. Negative for ear pain.    Respiratory: Positive for cough. Negative for shortness of breath and wheezing.    Gastrointestinal: Negative for abdominal pain, diarrhea, nausea and vomiting.   Musculoskeletal: Positive for myalgias. Negative for neck pain.   Skin: Negative for rash.   Allergic/Immunologic: Positive for environmental allergies.   Neurological: Positive for headaches. Negative for dizziness and light-headedness.       Past Medical History:   Diagnosis Date     NO ACTIVE PROBLEMS      Family History   Problem Relation Age of Onset     Family History Negative No family hx of      Current Outpatient Medications   Medication Sig Dispense Refill     albuterol (PROAIR HFA/PROVENTIL HFA/VENTOLIN HFA) 108 (90 Base) MCG/ACT inhaler Inhale 2 puffs into the lungs every 6 hours as needed for shortness of breath / dyspnea or wheezing 1 Inhaler 0     Naproxen Sodium (ALEVE PO)        ZYRTEC 10 MG OR TABS 1 TABLET DAILY       ALLEGRA-D 12 HOUR  MG PO TB12 1 TABLET TWICE DAILY ON EMPTY STOMACH (Patient not taking: No sig reported) 60 Tab 5     ALLEGRA-D 24 HOUR 180-240 MG PO TB24 TAKE ONE DAILY (Patient not taking: No sig reported) 30 Tab 5     FLUTICASONE PROPIONATE (NASAL) 50 MCG/ACT NA SUSP 2 squirts in each nostril daily (Patient not taking: No sig reported) 1 5      guaiFENesin-codeine (ROBITUSSIN AC) 100-10 MG/5ML SOLN solution Take 10 mLs by mouth every 4 hours as needed for cough 120 mL 0     ROBITUSSIN A-C  MG/5ML OR SYRP ONE TO TWO TEASPOONS EVER 4 HOURS, AS NEEDED FOR COUGH (Patient not taking: No sig reported) 4 OZ 0     Social History     Tobacco Use     Smoking status: Former Smoker     Smokeless tobacco: Never Used     Tobacco comment: quit 1999   Substance Use Topics     Alcohol use: Yes     Comment: socially       OBJECTIVE  /70   Pulse 78   Temp 101.3  F (38.5  C) (Tympanic)   Resp 20   SpO2 98%     Physical Exam  Vitals signs and nursing note reviewed.   Constitutional:       Appearance: She is well-groomed. She is ill-appearing.   HENT:      Head: Normocephalic and atraumatic.      Right Ear: Tympanic membrane, ear canal and external ear normal.      Left Ear: Tympanic membrane, ear canal and external ear normal.      Nose: Congestion and rhinorrhea present.      Right Sinus: No maxillary sinus tenderness or frontal sinus tenderness.      Left Sinus: No maxillary sinus tenderness or frontal sinus tenderness.      Mouth/Throat:      Mouth: Mucous membranes are moist.      Pharynx: Posterior oropharyngeal erythema present. No oropharyngeal exudate.      Tonsils: No tonsillar exudate. 1+ on the right. 1+ on the left.   Eyes:      Extraocular Movements: Extraocular movements intact.      Conjunctiva/sclera: Conjunctivae normal.      Pupils: Pupils are equal, round, and reactive to light.   Neck:      Musculoskeletal: Normal range of motion and neck supple.   Cardiovascular:      Rate and Rhythm: Normal rate and regular rhythm.      Heart sounds: Normal heart sounds.   Pulmonary:      Effort: Pulmonary effort is normal.      Breath sounds: Normal breath sounds and air entry.   Lymphadenopathy:      Head:      Right side of head: Submandibular and tonsillar adenopathy present.      Left side of head: Submandibular and tonsillar adenopathy present.       Cervical: Cervical adenopathy present.   Skin:     General: Skin is warm and dry.   Neurological:      Mental Status: She is alert and oriented to person, place, and time.   Psychiatric:         Behavior: Behavior is cooperative.         Labs:  Results for orders placed or performed in visit on 02/27/20 (from the past 24 hour(s))   Streptococcus A Rapid Scr w Reflx to PCR   Result Value Ref Range    Strep Specimen Description Throat     Streptococcus Group A Rapid Screen Negative NEG^Negative   Influenza A/B antigen   Result Value Ref Range    Influenza A/B Agn Specimen Nasal     Influenza A Positive (A) NEG^Negative    Influenza B Negative NEG^Negative     ASSESSMENT:      ICD-10-CM    1. Influenza A J10.1    2. Sore throat J02.9 Streptococcus A Rapid Scr w Reflx to PCR     Influenza A/B antigen     Group A Streptococcus PCR Throat Swab        Medical Decision Making:    Differential Diagnosis:  URI Adult/Peds:  Bronchitis-viral, Influenza, Pneumonia, Sinusitis, Strep pharyngitis, Tonsilitis, Viral pharyngitis and Viral upper respiratory illness    Serious Comorbid Conditions:  Adult:  None    PLAN:  Discussed with patient that rapid strep was negative. Will do confirmatory testing and notify if positive. She was positive for influenza A. Will treat with tamiflu twice a day for 5 days. Additional symptomatic treatment recommendations discussed. Education was added to AVS. Patient was agreeable to plan and verbalized understanding.     Followup:    If not improving in 1 week or if condition worsens, follow up with your Primary Care Provider    Patient Instructions     tamiflu twice a day for 5 days    Self-Care at Home for Viral Illness        Diagnosis - what is wrong today         ? Cold or Flu  ? Middle ear fluid  ? Cough ? Viral sore throat  ? Bronchitis  ? Other____________________________________________   You have a viral illness. Antibiotics do not cure viral infections. The treatments prescribed below will  help you feel better while your body's own defenses are fighting the virus.   ? General instructions  Try the remedies below. In general:    Rest and sleep as much as needed.    Drink water and other clear fluids.    Do not smoke, and avoid smoke from other people.  ? Sinus pain or stuffy nose    Put a warm, moist washcloth on your face where you feel sinus pain or pressure.    Use a nasal spray with medicine or saline, as directed by your healthcare provider.    Breathe in steam from a hot shower.    Use a humidifier or cool mist vaporizer.   ? Cough    Use a humidifier or cool mist vaporizer.    Breathe in steam from a hot shower.    Use cough lozenges.   ?  Sore throat    Suck on ice chips, popsicles, or lozenges.    Use a sore throat spray.    Use a humidifier or cool mist vaporizer.    Gargle with saltwater.    Drink warm liquids.   ?  Ear pain    Hold a warm, moist washcloth on the ear for   10 minutes at a time.   ? Follow up  Call or return for a recheck if:    You have not improved in 5 -7 days.    You have new symptoms or other concerns. Medicines      You do not need a prescription for these helpful medicines. Take these medicines as directed on the package.      Pain: Tylenol and Ibuprofen.     Cough: Lozenges, cough suppressant (mucinex, guaifenisin)    Stuffy nose or sinus pain: Decongestants (afrin, neti pot), decongestant/antihistamine (Aprodex, Actifed), Pseudophedrine (sudafed)     Sore throat: Lozenges, anesthetic spray    Ear pain: Tylenol and Ibuprofen          Why not use antibiotics?  Infections can be caused by bacteria or viruses. Your [facility] provider is committed to prescribing antibiotics only when you need them.    Virus: Most infections are due to viruses. Infections caused by a virus cannot be treated with antibiotics.    Bacteria: Antibiotics can only treat illnesses that are caused by bacteria. When you need them, antibiotics can helpful in restoring you to good health.    What is the harm in getting an antibiotic?  We are learning more every day.       Antibiotics have side effects - some can be severe.  Up to 1 out of 4 people have a chance of having a side effect when taking an antibiotic. Some side effects are bothersome, but others can be severe. Each year many people go to the emergency department because of an antibiotic side effect. Some side effects of certain antibiotics include:    Upset stomach or throwing up (nausea or vomiting).    Watery stools (diarrhea).    Allergic reactions.    Permanent nerve damage (rare).    Torn tendons (rare).  Antibiotics can make bacteria tougher and harder to treat in the future. Bacteria can change so some antibiotics do not kill them anymore. When antibiotics are needed to save lives, we need them to work effectively.     Treating a virus with antibiotics is like using an umbrella to try to stay warm on a windy winter day. It just doesn't work.      Then, when it starts to rain, the umbrella may be worn out.    The protection the umbrella gives is   like an antibiotic. It is best to use it   when you need it most.     Getting an antibiotic makes you more likely to carry a drug-resistant bacteria that could infect you or those around you.    The good bacteria in your gut may be replaced by bad bacteria. For example, people receiving antibiotics have more infections with bad diarrhea called  C-diff.    Each year in the [place]:  Drug-resistant infections kill at least 23,000 people.  Severe diarrhea from C-diff sickens 250,000 people and causes 14,000 deaths.   Antibiotics kill or change the good bacteria in your body for a long time, up to a year or longer. Changing good bacteria in your body may be the reason antibiotics have recently been linked to:     Weight gain, obesity, and possibly even diabetes when antibiotics are taken often at an early age.    Asthma, allergy, and immune system problems when antibiotics are taken when  young.    Possible precancerous polyps in your colon when antibiotics are taken later in life.      Because of these reasons, you should only take antibiotics when they are clearly needed.      Photo by: Rene Leavitt VIII, reprinted according to the following terms of use: https://creativecommons.org/licenses/by/2.0/legalcode

## 2020-03-02 ENCOUNTER — ANCILLARY PROCEDURE (OUTPATIENT)
Dept: MAMMOGRAPHY | Facility: CLINIC | Age: 40
End: 2020-03-02
Payer: COMMERCIAL

## 2020-03-02 DIAGNOSIS — Z12.31 OTHER SCREENING MAMMOGRAM: ICD-10-CM

## 2020-03-02 PROCEDURE — 77067 SCR MAMMO BI INCL CAD: CPT | Mod: TC

## 2020-03-02 PROCEDURE — 77063 BREAST TOMOSYNTHESIS BI: CPT | Mod: TC

## 2021-01-24 ENCOUNTER — HEALTH MAINTENANCE LETTER (OUTPATIENT)
Age: 41
End: 2021-01-24

## 2021-01-28 ENCOUNTER — IMMUNIZATION (OUTPATIENT)
Dept: NURSING | Facility: CLINIC | Age: 41
End: 2021-01-28
Payer: COMMERCIAL

## 2021-01-28 PROCEDURE — 0001A PR COVID VAC PFIZER DIL RECON 30 MCG/0.3 ML IM: CPT

## 2021-01-28 PROCEDURE — 91300 PR COVID VAC PFIZER DIL RECON 30 MCG/0.3 ML IM: CPT

## 2021-02-18 ENCOUNTER — IMMUNIZATION (OUTPATIENT)
Dept: NURSING | Facility: CLINIC | Age: 41
End: 2021-02-18
Attending: INTERNAL MEDICINE
Payer: COMMERCIAL

## 2021-02-18 PROCEDURE — 91300 PR COVID VAC PFIZER DIL RECON 30 MCG/0.3 ML IM: CPT

## 2021-02-18 PROCEDURE — 0002A PR COVID VAC PFIZER DIL RECON 30 MCG/0.3 ML IM: CPT

## 2021-03-04 ENCOUNTER — ANCILLARY PROCEDURE (OUTPATIENT)
Dept: MAMMOGRAPHY | Facility: CLINIC | Age: 41
End: 2021-03-04
Payer: COMMERCIAL

## 2021-03-04 DIAGNOSIS — Z12.31 VISIT FOR SCREENING MAMMOGRAM: ICD-10-CM

## 2021-03-04 PROCEDURE — 77067 SCR MAMMO BI INCL CAD: CPT | Mod: TC | Performed by: RADIOLOGY

## 2021-03-04 PROCEDURE — 77063 BREAST TOMOSYNTHESIS BI: CPT | Mod: TC | Performed by: RADIOLOGY

## 2021-09-04 ENCOUNTER — HEALTH MAINTENANCE LETTER (OUTPATIENT)
Age: 41
End: 2021-09-04

## 2022-02-19 ENCOUNTER — HEALTH MAINTENANCE LETTER (OUTPATIENT)
Age: 42
End: 2022-02-19

## 2022-02-21 ENCOUNTER — HOSPITAL ENCOUNTER (OUTPATIENT)
Dept: MAMMOGRAPHY | Facility: CLINIC | Age: 42
End: 2022-02-21
Attending: NURSE PRACTITIONER
Payer: COMMERCIAL

## 2022-02-21 DIAGNOSIS — N64.52 BREAST DISCHARGE: ICD-10-CM

## 2022-02-21 PROCEDURE — 77062 BREAST TOMOSYNTHESIS BI: CPT

## 2022-10-22 ENCOUNTER — HEALTH MAINTENANCE LETTER (OUTPATIENT)
Age: 42
End: 2022-10-22

## 2023-02-22 ENCOUNTER — HOSPITAL ENCOUNTER (OUTPATIENT)
Dept: MAMMOGRAPHY | Facility: CLINIC | Age: 43
Discharge: HOME | End: 2023-02-22
Payer: COMMERCIAL

## 2023-02-22 DIAGNOSIS — Z12.31 ENCOUNTER FOR SCREENING MAMMOGRAM FOR MALIGNANT NEOPLASM OF BREAST: ICD-10-CM

## 2023-02-22 PROCEDURE — 77067 SCR MAMMO BI INCL CAD: CPT

## 2023-04-01 ENCOUNTER — HEALTH MAINTENANCE LETTER (OUTPATIENT)
Age: 43
End: 2023-04-01

## 2024-02-29 ENCOUNTER — HOSPITAL ENCOUNTER (OUTPATIENT)
Dept: MAMMOGRAPHY | Facility: CLINIC | Age: 44
Discharge: HOME | End: 2024-02-29
Payer: COMMERCIAL

## 2024-02-29 DIAGNOSIS — Z12.31 VISIT FOR SCREENING MAMMOGRAM: ICD-10-CM

## 2024-02-29 PROCEDURE — 77063 BREAST TOMOSYNTHESIS BI: CPT

## 2024-10-20 ENCOUNTER — HEALTH MAINTENANCE LETTER (OUTPATIENT)
Age: 44
End: 2024-10-20